# Patient Record
(demographics unavailable — no encounter records)

---

## 2025-04-16 NOTE — HISTORY OF PRESENT ILLNESS
[FreeTextEntry1] : CC: c/o light vag bleeding x 2 days last wk. LMP: HPI: PT is on HRT, using combipatch once weekly since 2024   POBGYN:   x 3  PAP: 2025 Mammo:  Colonoscopy:

## 2025-04-16 NOTE — PLAN
[FreeTextEntry1] : Bleeding likely is due to side effect of Combipatch.  PT is only using it once weekly. Normal pelvic sonogram w/ Em thickness<3mm. Defer embx for now. Restart combipatch after 4wks RTO if bleeding recurs